# Patient Record
Sex: MALE | Race: WHITE
[De-identification: names, ages, dates, MRNs, and addresses within clinical notes are randomized per-mention and may not be internally consistent; named-entity substitution may affect disease eponyms.]

---

## 2018-12-18 ENCOUNTER — HOSPITAL ENCOUNTER (OUTPATIENT)
Age: 58
End: 2018-12-18
Payer: COMMERCIAL

## 2018-12-18 DIAGNOSIS — Z12.5: Primary | ICD-10-CM

## 2018-12-18 LAB — PSA,TOTAL - ANNUAL SCREEN: 1.05 NG/ML (ref 0–4)

## 2018-12-18 PROCEDURE — 36415 COLL VENOUS BLD VENIPUNCTURE: CPT

## 2018-12-18 PROCEDURE — G0103 PSA SCREENING: HCPCS

## 2018-12-18 PROCEDURE — 84153 ASSAY OF PSA TOTAL: CPT

## 2019-07-24 ENCOUNTER — HOSPITAL ENCOUNTER (OUTPATIENT)
Age: 59
End: 2019-07-24
Payer: COMMERCIAL

## 2019-07-24 DIAGNOSIS — C44.612: Primary | ICD-10-CM

## 2019-07-24 PROCEDURE — 88305 TISSUE EXAM BY PATHOLOGIST: CPT

## 2019-11-14 ENCOUNTER — HOSPITAL ENCOUNTER (OUTPATIENT)
Age: 59
End: 2019-11-14
Payer: COMMERCIAL

## 2019-11-14 DIAGNOSIS — M47.812: ICD-10-CM

## 2019-11-14 DIAGNOSIS — M50.31: ICD-10-CM

## 2019-11-14 DIAGNOSIS — M19.012: ICD-10-CM

## 2019-11-14 DIAGNOSIS — M50.30: Primary | ICD-10-CM

## 2019-11-14 DIAGNOSIS — M19.011: ICD-10-CM

## 2019-11-14 DIAGNOSIS — M48.02: ICD-10-CM

## 2019-11-14 PROCEDURE — 73030 X-RAY EXAM OF SHOULDER: CPT

## 2019-11-14 PROCEDURE — 72050 X-RAY EXAM NECK SPINE 4/5VWS: CPT

## 2019-11-27 ENCOUNTER — HOSPITAL ENCOUNTER (OUTPATIENT)
Age: 59
End: 2019-11-27
Payer: COMMERCIAL

## 2019-11-27 DIAGNOSIS — M50.30: Primary | ICD-10-CM

## 2019-11-27 DIAGNOSIS — M48.02: ICD-10-CM

## 2019-11-27 PROCEDURE — 72141 MRI NECK SPINE W/O DYE: CPT

## 2019-12-19 ENCOUNTER — HOSPITAL ENCOUNTER (OUTPATIENT)
Age: 59
End: 2019-12-19
Payer: COMMERCIAL

## 2019-12-19 DIAGNOSIS — Z12.5: Primary | ICD-10-CM

## 2019-12-19 LAB — PSA,TOTAL - ANNUAL SCREEN: 1.07 NG/ML (ref 0–4)

## 2019-12-19 PROCEDURE — 36415 COLL VENOUS BLD VENIPUNCTURE: CPT

## 2019-12-19 PROCEDURE — G0103 PSA SCREENING: HCPCS

## 2019-12-19 PROCEDURE — 84153 ASSAY OF PSA TOTAL: CPT

## 2019-12-23 ENCOUNTER — HOSPITAL ENCOUNTER (OUTPATIENT)
Dept: HOSPITAL 100 - PT | Age: 59
Discharge: HOME | End: 2019-12-23
Payer: COMMERCIAL

## 2019-12-23 DIAGNOSIS — M48.02: Primary | ICD-10-CM

## 2019-12-23 PROCEDURE — 97162 PT EVAL MOD COMPLEX 30 MIN: CPT

## 2019-12-23 PROCEDURE — 97012 MECHANICAL TRACTION THERAPY: CPT

## 2019-12-23 PROCEDURE — 97110 THERAPEUTIC EXERCISES: CPT

## 2021-01-12 ENCOUNTER — HOSPITAL ENCOUNTER (OUTPATIENT)
Age: 61
End: 2021-01-12
Payer: COMMERCIAL

## 2021-01-12 DIAGNOSIS — Z12.5: Primary | ICD-10-CM

## 2021-01-12 LAB — PSA,TOTAL - ANNUAL SCREEN: 1.18 NG/ML (ref 0–4)

## 2021-01-12 PROCEDURE — 84153 ASSAY OF PSA TOTAL: CPT

## 2021-01-12 PROCEDURE — 36415 COLL VENOUS BLD VENIPUNCTURE: CPT

## 2021-01-12 PROCEDURE — G0103 PSA SCREENING: HCPCS

## 2021-04-16 ENCOUNTER — HOSPITAL ENCOUNTER (OUTPATIENT)
Dept: HOSPITAL 100 - EN | Age: 61
Discharge: HOME | End: 2021-04-16
Payer: COMMERCIAL

## 2021-04-16 VITALS
OXYGEN SATURATION: 96 % | HEART RATE: 72 BPM | DIASTOLIC BLOOD PRESSURE: 61 MMHG | RESPIRATION RATE: 16 BRPM | SYSTOLIC BLOOD PRESSURE: 128 MMHG | TEMPERATURE: 97.8 F

## 2021-04-16 VITALS
HEART RATE: 53 BPM | RESPIRATION RATE: 16 BRPM | SYSTOLIC BLOOD PRESSURE: 94 MMHG | OXYGEN SATURATION: 97 % | DIASTOLIC BLOOD PRESSURE: 72 MMHG

## 2021-04-16 VITALS
OXYGEN SATURATION: 98 % | DIASTOLIC BLOOD PRESSURE: 61 MMHG | SYSTOLIC BLOOD PRESSURE: 96 MMHG | HEART RATE: 68 BPM | RESPIRATION RATE: 16 BRPM

## 2021-04-16 VITALS
HEART RATE: 55 BPM | SYSTOLIC BLOOD PRESSURE: 88 MMHG | TEMPERATURE: 98.06 F | OXYGEN SATURATION: 99 % | RESPIRATION RATE: 16 BRPM | DIASTOLIC BLOOD PRESSURE: 64 MMHG

## 2021-04-16 VITALS
DIASTOLIC BLOOD PRESSURE: 61 MMHG | HEART RATE: 56 BPM | SYSTOLIC BLOOD PRESSURE: 128 MMHG | RESPIRATION RATE: 16 BRPM | OXYGEN SATURATION: 97 %

## 2021-04-16 VITALS
RESPIRATION RATE: 16 BRPM | DIASTOLIC BLOOD PRESSURE: 61 MMHG | OXYGEN SATURATION: 99 % | SYSTOLIC BLOOD PRESSURE: 128 MMHG | TEMPERATURE: 97.88 F | HEART RATE: 55 BPM

## 2021-04-16 VITALS
RESPIRATION RATE: 16 BRPM | DIASTOLIC BLOOD PRESSURE: 61 MMHG | OXYGEN SATURATION: 97 % | SYSTOLIC BLOOD PRESSURE: 85 MMHG | HEART RATE: 59 BPM

## 2021-04-16 VITALS — DIASTOLIC BLOOD PRESSURE: 61 MMHG | SYSTOLIC BLOOD PRESSURE: 128 MMHG

## 2021-04-16 VITALS — BODY MASS INDEX: 23.8 KG/M2

## 2021-04-16 DIAGNOSIS — N40.0: ICD-10-CM

## 2021-04-16 DIAGNOSIS — K21.9: ICD-10-CM

## 2021-04-16 DIAGNOSIS — Z12.11: Primary | ICD-10-CM

## 2021-04-16 DIAGNOSIS — Z79.899: ICD-10-CM

## 2021-04-16 PROCEDURE — 0DJD8ZZ INSPECTION OF LOWER INTESTINAL TRACT, VIA NATURAL OR ARTIFICIAL OPENING ENDOSCOPIC: ICD-10-PCS | Performed by: SURGERY

## 2021-04-16 PROCEDURE — 87426 SARSCOV CORONAVIRUS AG IA: CPT

## 2021-04-16 PROCEDURE — 45378 DIAGNOSTIC COLONOSCOPY: CPT

## 2021-04-16 PROCEDURE — C9803 HOPD COVID-19 SPEC COLLECT: HCPCS

## 2021-04-19 ENCOUNTER — HOSPITAL ENCOUNTER (OUTPATIENT)
Age: 61
End: 2021-04-19
Payer: COMMERCIAL

## 2021-04-19 VITALS — BODY MASS INDEX: 23.8 KG/M2

## 2021-04-19 DIAGNOSIS — Z13.220: Primary | ICD-10-CM

## 2021-04-19 LAB
CHOLEST SERPL-MCNC: 241 MG/DL
TRIGLYCERIDES: 39 MG/DL
VLDLC SERPL-MCNC: 8 MG/DL (ref 5–40)

## 2021-04-19 PROCEDURE — 80061 LIPID PANEL: CPT

## 2021-04-19 PROCEDURE — 36415 COLL VENOUS BLD VENIPUNCTURE: CPT

## 2021-10-26 ENCOUNTER — HOSPITAL ENCOUNTER (OUTPATIENT)
Age: 61
End: 2021-10-26
Payer: COMMERCIAL

## 2021-10-26 DIAGNOSIS — R10.13: ICD-10-CM

## 2021-10-26 DIAGNOSIS — C32.0: Primary | ICD-10-CM

## 2021-10-26 LAB
CREATININE FINGERSTICK: 0.6 MG/DL (ref 0.7–1.3)
EGFR FINGERSTICK: > 60 ML/MIN (ref 60–?)

## 2021-10-26 PROCEDURE — 70491 CT SOFT TISSUE NECK W/DYE: CPT

## 2022-10-25 ENCOUNTER — HOSPITAL ENCOUNTER (OUTPATIENT)
Dept: HOSPITAL 100 - MFPLAB | Age: 62
Discharge: HOME | End: 2022-10-25
Payer: COMMERCIAL

## 2022-10-25 DIAGNOSIS — Z13.220: ICD-10-CM

## 2022-10-25 DIAGNOSIS — Z00.00: Primary | ICD-10-CM

## 2022-10-25 LAB
ALANINE AMINOTRANSFER ALT/SGPT: 22 U/L (ref 16–61)
ALBUMIN SERPL-MCNC: 3.7 G/DL (ref 3.2–5)
ALKALINE PHOSPHATASE: 68 U/L (ref 45–117)
ANION GAP: 7 (ref 5–15)
AST(SGOT): 20 U/L (ref 15–37)
BUN SERPL-MCNC: 20 MG/DL (ref 7–18)
BUN/CREAT RATIO: 20.7 RATIO (ref 10–20)
CALCIUM SERPL-MCNC: 9.3 MG/DL (ref 8.5–10.1)
CARBON DIOXIDE: 25 MMOL/L (ref 21–32)
CHLORIDE: 107 MMOL/L (ref 98–107)
CHOLEST SERPL-MCNC: 222 MG/DL
EST GLOM FILT RATE - AFR AMER: 102 ML/MIN (ref 60–?)
GLOBULIN: 3.2 G/DL (ref 2.2–4.2)
GLUCOSE: 86 MG/DL (ref 74–106)
POTASSIUM: 4.5 MMOL/L (ref 3.5–5.1)
TRIGLYCERIDES: 35 MG/DL
VLDLC SERPL-MCNC: 7 MG/DL (ref 5–40)

## 2022-10-25 PROCEDURE — 80053 COMPREHEN METABOLIC PANEL: CPT

## 2022-10-25 PROCEDURE — 36415 COLL VENOUS BLD VENIPUNCTURE: CPT

## 2022-10-25 PROCEDURE — 80061 LIPID PANEL: CPT

## 2023-01-19 ENCOUNTER — HOSPITAL ENCOUNTER (OUTPATIENT)
Dept: HOSPITAL 100 - LAB | Age: 63
Discharge: HOME | End: 2023-01-19
Payer: COMMERCIAL

## 2023-01-19 DIAGNOSIS — Z12.5: Primary | ICD-10-CM

## 2023-01-19 LAB — PSA,TOTAL - ANNUAL SCREEN: 1.27 NG/ML (ref 0–4)

## 2023-01-19 PROCEDURE — 84153 ASSAY OF PSA TOTAL: CPT

## 2023-01-19 PROCEDURE — G0103 PSA SCREENING: HCPCS

## 2023-01-19 PROCEDURE — 36415 COLL VENOUS BLD VENIPUNCTURE: CPT

## 2023-12-07 ENCOUNTER — HOSPITAL ENCOUNTER (OUTPATIENT)
Dept: HOSPITAL 100 - MTLAB | Age: 63
Discharge: HOME | End: 2023-12-07
Payer: COMMERCIAL

## 2023-12-07 DIAGNOSIS — Z13.1: Primary | ICD-10-CM

## 2023-12-07 DIAGNOSIS — Z13.220: ICD-10-CM

## 2023-12-07 LAB
ANION GAP: 1 (ref 5–15)
BUN SERPL-MCNC: 18 MG/DL (ref 7–18)
BUN/CREAT RATIO: 19.8 RATIO (ref 10–20)
CALCIUM SERPL-MCNC: 8.9 MG/DL (ref 8.5–10.1)
CARBON DIOXIDE: 30 MMOL/L (ref 21–32)
CHLORIDE: 109 MMOL/L (ref 98–107)
CHOLEST SERPL-MCNC: 222 MG/DL
EST GLOM FILT RATE - AFR AMER: 109 ML/MIN (ref 60–?)
GLUCOSE: 98 MG/DL (ref 74–106)
POTASSIUM: 3.9 MMOL/L (ref 3.5–5.1)
TRIGLYCERIDES: 52 MG/DL
VLDLC SERPL-MCNC: 10 MG/DL (ref 5–40)

## 2023-12-07 PROCEDURE — 36415 COLL VENOUS BLD VENIPUNCTURE: CPT

## 2023-12-07 PROCEDURE — 80061 LIPID PANEL: CPT

## 2023-12-07 PROCEDURE — 80048 BASIC METABOLIC PNL TOTAL CA: CPT

## 2024-01-29 ENCOUNTER — HOSPITAL ENCOUNTER (OUTPATIENT)
Dept: HOSPITAL 100 - LAB | Age: 64
Discharge: HOME | End: 2024-01-29
Payer: COMMERCIAL

## 2024-01-29 DIAGNOSIS — Z12.5: Primary | ICD-10-CM

## 2024-01-29 LAB — PSA,TOTAL - ANNUAL SCREEN: 1.77 NG/ML (ref 0–4)

## 2024-01-29 PROCEDURE — 84153 ASSAY OF PSA TOTAL: CPT

## 2024-01-29 PROCEDURE — G0103 PSA SCREENING: HCPCS

## 2024-01-29 PROCEDURE — 36415 COLL VENOUS BLD VENIPUNCTURE: CPT

## 2024-02-21 ENCOUNTER — HOSPITAL ENCOUNTER (OUTPATIENT)
Dept: RADIOLOGY | Facility: CLINIC | Age: 64
Discharge: HOME | End: 2024-02-21
Payer: COMMERCIAL

## 2024-02-21 DIAGNOSIS — Z00.00 ENCOUNTER FOR GENERAL ADULT MEDICAL EXAMINATION WITHOUT ABNORMAL FINDINGS: ICD-10-CM

## 2024-02-21 PROCEDURE — 75571 CT HRT W/O DYE W/CA TEST: CPT

## 2024-08-28 ENCOUNTER — HOSPITAL ENCOUNTER (OUTPATIENT)
Dept: HOSPITAL 100 - MFPLAB | Age: 64
Discharge: HOME | End: 2024-08-28
Payer: COMMERCIAL

## 2024-08-28 DIAGNOSIS — L29.9: Primary | ICD-10-CM

## 2024-08-28 LAB
ALANINE AMINOTRANSFER ALT/SGPT: 20 U/L (ref 16–61)
ALBUMIN SERPL-MCNC: 3.7 G/DL (ref 3.2–5)
ALKALINE PHOSPHATASE: 69 U/L (ref 45–117)
ANION GAP: 7 (ref 5–15)
AST(SGOT): 16 U/L (ref 15–37)
BUN SERPL-MCNC: 32 MG/DL (ref 7–18)
BUN/CREAT RATIO: 35.4 RATIO (ref 10–20)
CALCIUM SERPL-MCNC: 9 MG/DL (ref 8.5–10.1)
CARBON DIOXIDE: 26 MMOL/L (ref 21–32)
CHLORIDE: 106 MMOL/L (ref 98–107)
DEPRECATED RDW RBC: 40.3 FL (ref 35.1–43.9)
ERYTHROCYTE [DISTWIDTH] IN BLOOD: 11.9 % (ref 11.6–14.6)
EST GLOM FILT RATE - AFR AMER: 109 ML/MIN (ref 60–?)
GLOBULIN: 3.1 G/DL (ref 2.2–4.2)
GLUCOSE: 113 MG/DL (ref 74–106)
HCT VFR BLD AUTO: 41.1 % (ref 40–54)
HEMOGLOBIN: 13.5 G/DL (ref 13–16.5)
HGB BLD-MCNC: 13.5 G/DL (ref 13–16.5)
IMMATURE GRANULOCYTES COUNT: 0.02 X10^3/UL (ref 0–0)
MCV RBC: 91.7 FL (ref 80–94)
MEAN CORP HGB CONC: 32.8 G/DL (ref 32–36)
MEAN PLATELET VOL.: 9.8 FL (ref 6.2–12)
NRBC FLAGGED BY ANALYZER: 0 % (ref 0–5)
PLATELET # BLD: 287 K/MM3 (ref 150–450)
PLATELET COUNT: 287 K/MM3 (ref 150–450)
POTASSIUM: 4.2 MMOL/L (ref 3.5–5.1)
RBC # BLD AUTO: 4.48 M/MM3 (ref 4.6–6.2)
RBC DISTRIBUTION WIDTH CV: 11.9 % (ref 11.6–14.6)
RBC DISTRIBUTION WIDTH SD: 40.3 FL (ref 35.1–43.9)
WBC # BLD AUTO: 6.8 K/MM3 (ref 4.4–11)
WHITE BLOOD COUNT: 6.8 K/MM3 (ref 4.4–11)

## 2024-08-28 PROCEDURE — 85025 COMPLETE CBC W/AUTO DIFF WBC: CPT

## 2024-08-28 PROCEDURE — 80053 COMPREHEN METABOLIC PANEL: CPT

## 2024-08-28 PROCEDURE — 85652 RBC SED RATE AUTOMATED: CPT

## 2024-08-28 PROCEDURE — 36415 COLL VENOUS BLD VENIPUNCTURE: CPT

## 2024-08-28 PROCEDURE — 84443 ASSAY THYROID STIM HORMONE: CPT

## 2024-10-23 ENCOUNTER — HOSPITAL ENCOUNTER (OUTPATIENT)
Dept: HOSPITAL 100 - MTRAD | Age: 64
Discharge: HOME | End: 2024-10-23
Payer: COMMERCIAL

## 2024-10-23 DIAGNOSIS — M50.30: Primary | ICD-10-CM

## 2024-10-23 PROCEDURE — 72050 X-RAY EXAM NECK SPINE 4/5VWS: CPT

## 2024-10-23 NOTE — XMS RPT_ITS
Comprehensive CCD (C-CDA v2.1)  
  
                          Created on: 2024  
  
  
KAYCE LARSON  
External Reference #: CDR,PersonID:[id number]  
: 1960  
Sex: Male  
  
Demographics  
  
  
                                        Address             [address]  [address]  
   
                                        Home Phone          [phone]  
   
                                        Work Phone          [phone]  
   
                                        Home Phone          [phone]  
   
                                        Work Phone          [phone]  
   
                                        Preferred Language  en  
   
                                        Marital Status        
   
                                        Congregation Affiliation Unknown  
   
                                        Race                White  
   
                                        Ethnic Group        Not  or Lati  
no  
  
  
Author  
  
  
                                        Organization        [facility]  
ion Partnership [facility]  
  
  
Care Team Providers  
  
  
                                Care Team Member Name Role            Phone  
   
                                Unavailable     Primary Care Provider Unavailabl e RANNEY, CHRISTOPHER BEHR Referring       Unavail  
able  
  
  
  
Allergies  
  
  
                                                    Allergy   
Classification                          Reported   
Allergen(s)               Allergy Type              Date of   
Onset                     Reaction(s)               Facility  
   
                                                      
(1 source)                              ALLERGIES NOT ON   
FILE;   
Translations:   
[ALLERGIES NOT   
ON FILE]                                Propensity to   
adverse   
reactions   
(disorder)                                                  [facility]   
Repository  
  
  
  
Results  
  
  
                          Test Name    Value        Interpretation Reference   
Range                                   Facility  
   
                                                    CT CARDIAC SCORING WO IV CON  
TRASTon 2024   
   
                                                    CT CARDIAC SCORING   
WO IV CONTRAST                          Interpreted By:   
Roel Vallejo,  
STUDY:  
CT CARDIAC SCORING WO   
IV CONTRAST;   
2024 8:36 am  
  
INDICATION:  
Signs/Symptoms:SCREEN  
ING.  
  
COMPARISON:  
None.  
  
ACCESSION NUMBER(S):  
[id number]  
  
ORDERING CLINICIAN:  
MATTEO JAUREGUI  
  
TECHNIQUE:  
Using prospective ECG   
gating, CT scan of   
the coronary arteries   
was  
performed without   
intravenous contrast.   
Coronary calcium   
scoring was  
performed according   
to the method of   
Agatston.  
  
FINDINGS:  
The score and   
distribution of   
calcium in the   
coronary arteries is   
as  
follows:  
  
LM 0  
LAD 8.9  
LCx 0  
RCA 54  
  
Total 64  
  
The visualized   
mid/lower ascending   
thoracic aorta   
measures 3.6 cm in  
diameter. The heart   
is normal in size. No   
pericardial effusion   
is  
present.  
  
No gross evidence of   
mediastinal or hilar   
lymphadenopathy or   
masses  
is identified. The   
visualized segments   
of the lungs are   
normally  
expanded.  
  
The visualized   
subdiaphragmatic   
structures appear   
intact.  
  
IMPRESSION:  
1. Coronary artery   
calcium score of 64*.  
  
*Coronary artery   
calcium scoring may   
be helpful in   
predicting the  
risk for future   
coronary heart   
disease events.   
According to the  
American College of   
Cardiology Foundation   
Clinical Expert   
Consensus  
Task Force, such   
testing provides   
important prognostic   
information in  
patients with more   
than one coronary   
heart disease risk   
factor. The  
coronary artery   
calcium score   
correlates with the   
annual risk of a  
non-fatal myocardial   
infarction or   
coronary heart   
disease death.  
  
Coronary artery score   
Annual Risk  
  
0-99 0.4%  
100-399 1.3%  
>400 2.4%  
  
These three   
 breakpoints    
correspond to lower,   
intermediate and high  
risk states for   
future coronary   
events. Such   
information should be  
used, along with   
appropriate clinical   
judgment, to make   
decisions  
regarding the   
intensity of risk   
factor management   
strategies to treat  
blood lipids and to   
modify other   
non-lipid coronary   
risk factors.  
  
Reference: [place]   
P et al. Circulation.   
2007; 115:402-426  
  
MACRO:  
None  
  
Signed by: Roel Vallejo 2024 4:55   
PM  
Dictation   
workstation:   
[id number]        [facility]  
   
                                                    CT for calcium scoring WO co  
ntrast and CTA W contrast IV Heart and coronary   
arterieson 2024   
   
                                                            1. Coronary artery   
calcium score of 64*.  
  
*Coronary artery   
calcium scoring may   
be helpful in   
predicting the  
risk for future   
coronary heart   
disease events.   
According to the  
American College of   
Cardiology Foundation   
Clinical Expert   
Consensus  
Task Force, such   
testing provides   
important prognostic   
information in  
patients with more   
than one coronary   
heart disease risk   
factor. The  
coronary artery   
calcium score   
correlates with the   
annual risk of a  
non-fatal myocardial   
infarction or   
coronary heart   
disease death.  
  
Coronary artery score   
Annual Risk  
  
0-99 0.4%  
100-399 1.3%  
>400  2.4%  
  
These three   
 breakpoints    
correspond to lower,   
intermediate and high  
risk states for   
future coronary   
events. Such   
information should be  
used, along with   
appropriate clinical   
judgment, to make   
decisions  
regarding the   
intensity of risk   
factor management   
strategies to treat  
blood lipids and to   
modify other   
non-lipid coronary   
risk factors.  
  
Reference: [place]   
P et al. Circulation.   
2007; 115:402-426  
  
MACRO:  
None  
  
Signed by: Roel Vallejo 2024 4:55   
PM  
Dictation   
workstation:   
[id number]                                                 [facility]  
   
                                                            Interpreted By:   
Roel Vallejo,  
STUDY:  
CT CARDIAC SCORING WO   
IV CONTRAST;   
2024 8:36 am  
  
INDICATION:  
Signs/Symptoms:SCREEN  
ING.  
  
COMPARISON:  
None.  
  
ACCESSION NUMBER(S):  
[id number]  
  
ORDERING CLINICIAN:  
MATTEO JAUREGUI  
  
TECHNIQUE:  
Using prospective ECG   
gating, CT scan of   
the coronary arteries   
was  
performed without   
intravenous contrast.   
Coronary calcium   
scoring was  
performed according   
to the method of   
Agatston.  
  
FINDINGS:  
The score and   
distribution of   
calcium in the   
coronary arteries is   
as  
follows:  
  
LM 0  
LAD 8.9  
LCx 0  
RCA 54  
  
Total 64  
  
The visualized   
mid/lower ascending   
thoracic aorta   
measures 3.6 cm in  
diameter. The heart   
is normal in size. No   
pericardial effusion   
is  
present.  
  
No gross evidence of   
mediastinal or hilar   
lymphadenopathy or   
masses  
is identified. The   
visualized segments   
of the lungs are   
normally  
expanded.  
  
The visualized   
subdiaphragmatic   
structures appear   
intact.  
                                                            [facility]  
   
                                                            Roel Vallejo MD -  
   
2024 Formatting   
of this note might be   
different from the   
original.  
Interpreted By:   
Roel Vallejo,  
STUDY:  
CT CARDIAC SCORING WO   
IV CONTRAST;   
2024 8:36 am  
  
INDICATION:  
Signs/Symptoms:SCREEN  
ING.  
  
COMPARISON:  
None.  
  
ACCESSION NUMBER(S):  
[id number]  
  
ORDERING CLINICIAN:  
MATTEO JAUREGUI  
  
TECHNIQUE:  
Using prospective ECG   
gating, CT scan of   
the coronary arteries   
was  
performed without   
intravenous contrast.   
Coronary calcium   
scoring was  
performed according   
to the method of   
Agatston.  
  
FINDINGS:  
The score and   
distribution of   
calcium in the   
coronary arteries is   
as  
follows:  
  
LM 0  
LAD 8.9  
LCx 0  
RCA 54  
  
Total 64  
  
The visualized   
mid/lower ascending   
thoracic aorta   
measures 3.6 cm in  
diameter. The heart   
is normal in size. No   
pericardial effusion   
is  
present.  
  
No gross evidence of   
mediastinal or hilar   
lymphadenopathy or   
masses  
is identified. The   
visualized segments   
of the lungs are   
normally  
expanded.  
  
The visualized   
subdiaphragmatic   
structures appear   
intact.  
  
IMPRESSION:  
1. Coronary artery   
calcium score of 64*.  
  
*Coronary artery   
calcium scoring may   
be helpful in   
predicting the  
risk for future   
coronary heart   
disease events.   
According to the  
American College of   
Cardiology Foundation   
Clinical Expert   
Consensus  
Task Force, such   
testing provides   
important prognostic   
information in  
patients with more   
than one coronary   
heart disease risk   
factor. The  
coronary artery   
calcium score   
correlates with the   
annual risk of a  
non-fatal myocardial   
infarction or   
coronary heart   
disease death.  
  
Coronary artery score   
Annual Risk  
  
0-99 0.4%  
100-399 1.3%  
>400 2.4%  
  
These three   
 breakpoints    
correspond to lower,   
intermediate and high  
risk states for   
future coronary   
events. Such   
information should be  
used, along with   
appropriate clinical   
judgment, to make   
decisions  
regarding the   
intensity of risk   
factor management   
strategies to treat  
blood lipids and to   
modify other   
non-lipid coronary   
risk factors.  
  
Reference: [place]   
P et al. Circulation.   
2007; 115:402-426  
  
MACRO:  
None  
  
Signed by: Roel Vallejo 2024 4:55   
PM  
Dictation   
workstation:   
[id number]  
                                                            [facility]  
Work Phone:   
[phone]  
   
                                                    Radiology Study   
observation   
(narrative)                                                     [facility]  
Work Phone:   
[phone]  
   
                                                    CT for calcium scoring WO co  
ntrast and CTA W contrast IV Heart and coronary   
arteriesOrdered By: Roel Vallejo on 2024   
   
                                                                  [facility]  
Work Phone:   
[phone]  
  
  
  
Encounters  
  
  
                          Encounter Date Encounter Type Care Provider Facility  
   
                                                    Start: 2024  
End: 2024     ambulatory          CHRISTOPHER BEHR RANNEY [facility]  
   
                                                    Start: 2024  
End: 2024                         Encounter for general   
adult medical   
examination without   
abnormal findings         [facility]  
   
                                                    Start: 2024  
End: 2024                         Patient encounter   
status                    [address]  
Work Phone:   
[phone]  
   
                                                    Start: 2024  
End: 2024                         Subsequent hospital   
visit by physician        [address]  
   
                                        Comment on above:   Encounter for genera  
l adult medical examination without   
abnormal   
findings   
  
  
  
Procedures  
  
  
                          Date         Procedure    Procedure Detail Performing   
Clinician  
   
                                        Start: 2024   CT CARDIAC SCORING W  
O IV   
CONTRAST                                            MATTEO JAUREGUI  
   
                                        Start: 2024   Ct heart no contrast  
   
quant eval coronry   
calcium                                             Christopher Behr Ranney MD  
Work Phone: [phone]  
  
  
  
Plan of Treatment  
  
  
                          Date         Care Activity Detail       Author  
   
                                        Start: 2031   DTaP/Tdap/Td Vaccine  
s   
(3 - Td or Tdap)                        DTaP/Tdap/Td Vaccines   
(3 - Td or Tdap)                        [facility]  
   
                                        Start: 2023   COVID-19 Vaccine ( season)                         COVID-19 Vaccine ( season)                         [facility]  
   
                          Start: 1979 Hepatitis C screening Hepatitis C [facility]  
   
                                        Start: 1961   MMR Vaccines (1 of 1  
 -   
Standard series)                        MMR Vaccines (1 of 1 -   
Standard series)                        [facility]  
   
                          Start: 1960 HIV screening HIV Screening [facility]  
   
                          Start: 1960 Lipid panel  Lipid Panel  [facility]  
   
                                        Start: 1960   Screening for   
malignant neoplasm of   
colon                                               [facility]  
   
                          Start: 1960 Yearly Adult Physical Yearly Adult P  
hysical [facility]  
  
  
  
Payers  
  
  
                          Date         Payer Category Payer        Policy ID  
   
                                2023      Unknown         [employer]  
[employer] [facility] M[employer] [facility] MED   
[id number]   
2023-Present [address] [place]   
[address]                              [id number]  
[id number]  
   
                          2023   Unknown                   [id number]  
   
                          1960   Unknown                   [id number]   
[id number]  
  
  
  
Social History  
  
  
                          Date         Type         Detail       Facility  
   
                                                            Tobacco smoking   
status NHIS                             Tobacco smoking   
consumption unknown                     [facility]  
Work Phone:   
[phone]  
   
                          Start: 1960 Sex Assigned At Birth Not on file  [facility]  
Work Phone:   
[phone]  
   
                                       Gender identity Not on file  [facility]  
[facility]  
Work Phone:   
[phone]  
   
                                                    Start: 2024  
End: 2024                         Exposure to   
SARS-CoV-2 (event)        Not sure                  [facility]  
  
  
  
Evaluation note  
  
  
                                Note Date & Type Note            Facility  
  
  
  
                                                    Evaluation note   
  
  
  
                                                    Diagnosis  
   
                                                      
  
  
Encounter for general adult medical examination without abnormal findings  
  
documented in this encounter  
[facility]  
Work Phone: [phone]  
  
Reason for Referral  
  
  
                          Specialty    Diagnoses / Procedures Referred By Contac  
t Referred To Contact  
   
                                        Radiology             
  
  
Diagnoses  
  
  
Encounter for general adult   
medical examination without   
abnormal findings  
  
  
  
Procedures  
  
  
CT cardiac scoring wo IV   
contrast                                  
  
  
Ranney, Christopher Behr,  [address]  
  
  
[address]  
  
  
[place] [address]  
  
  
Phone: [phone]                       
  
  
  
  
  
                          Referral ID  Status       Reason       Start   
Date                                    Expiration   
Date                                    Visits   
Requested                               Visits   
Authorized  
   
                                        [id number]             Pending   
Review                                    
  
  
Perform   
Procedure                                 
3                   2024          1                   1  
  
  
  
Summary Purpose  
  
  
                                                      
  
  
  
Family History  
No Family History Records Found  
  
Advance Directives  
No Advanced Directives Records Found  
  
Additional Source Comments  
  
  
  
                                                    Reason for Visit (unrecogniz  
ed section and content)  
   
                                          
  
  
  
                          Specialty    Diagnoses / Procedures Referred By Contac  
t Referred To Contact  
   
                                        Radiology             
  
  
Diagnoses  
  
  
Encounter for general adult   
medical examination without   
abnormal findings  
  
  
  
Procedures  
  
  
CT cardiac scoring wo IV   
contrast                                  
  
  
Ranney, Christopher Behr, MD  
  
  
[address]  
  
  
[address]  
  
  
[place] [address]  
  
  
Phone: [phone]                       
  
  
  
  
  
                          Referral ID  Status       Reason       Start   
Date                                    Expiration   
Date                                    Visits   
Requested                               Visits   
Authorized  
   
                                        [id number]             Pending   
Review                                    
  
  
Perform   
Procedure                                 
3                   2024          1                   1  
  
  
  
  
  
                                                    PREGNANCY (unrecognized sect  
ion and content)  
   
                                                    No Pregnancy Status Records   
Found  
  
  
  
  
                                                    INFORMATION SOURCE (unrecogn  
ized section and content)  
   
                                          
  
  
  
                                        DATE CREATED        AUTHOR  
   
                                2024                      [facility]  
  
  
FOR RECORDS PERTAINING TO PATIENTS WHO ARE OR HAVE BEEN ENROLLED IN A CHEMICAL 
DEPENDENCY/SUBSTANCEABUSE PROGRAM, SOME INFORMATION MAY BE OMITTED. This 
clinical summary was aggregated from multiple sources. Caution should be 
exercised in using it in the provision of clinical care. This summary normalizes
information from multiple sources, and as a consequence, information in this 
document may materially change the coding, format and clinical context of 
patient data. In addition, data may be omitted in some cases. CLINICAL DECISIONS
SHOULD BE BASED ON THE PRIMARY CLINICAL RECORDS. [employer] provides 
no warranty or guarantee of the accuracy or completeness of information in this 
document.

## 2024-11-18 ENCOUNTER — HOSPITAL ENCOUNTER (OUTPATIENT)
Age: 64
Discharge: HOME | End: 2024-11-18
Payer: COMMERCIAL

## 2024-11-18 DIAGNOSIS — M50.30: Primary | ICD-10-CM

## 2024-11-18 PROCEDURE — 72141 MRI NECK SPINE W/O DYE: CPT

## 2025-01-30 ENCOUNTER — HOSPITAL ENCOUNTER (OUTPATIENT)
Dept: HOSPITAL 100 - LAB | Age: 65
Discharge: HOME | End: 2025-01-30
Payer: COMMERCIAL

## 2025-01-30 DIAGNOSIS — Z12.5: Primary | ICD-10-CM

## 2025-01-30 LAB — PSA,TOTAL - ANNUAL SCREEN: 2.22 NG/ML (ref 0–4)

## 2025-01-30 PROCEDURE — 36415 COLL VENOUS BLD VENIPUNCTURE: CPT

## 2025-01-30 PROCEDURE — G0103 PSA SCREENING: HCPCS

## 2025-01-30 PROCEDURE — 84153 ASSAY OF PSA TOTAL: CPT

## 2025-01-30 NOTE — XMS RPT_ITS
Comprehensive CCD (C-CDA v2.1)  
  
                          Created on: 2025  
  
  
KAYCE LARSON  
External Reference #: CDR,PersonID:[id number]  
: 1960  
Sex: Male  
  
Demographics  
  
  
                                        Address             [address]  [address]  
   
                                        Home Phone          [phone]  
   
                                        Work Phone          [phone]  
   
                                        Home Phone          [phone]  
   
                                        Work Phone          [phone]  
   
                                        Preferred Language  en  
   
                                        Marital Status        
   
                                        Religion Affiliation Unknown  
   
                                        Race                White  
   
                                        Ethnic Group        Not  or Lati  
no  
  
  
Author  
  
  
                                        Organization        [facility]  
ion Partnership [facility]  
  
  
Care Team Providers  
  
  
                                Care Team Member Name Role            Phone  
   
                                Unavailable     Primary Care Provider Unavailabl e RANNEY, CHRISTOPHER BEHR Referring       Unavail  
able  
  
  
  
Allergies  
  
  
                                                    Allergy   
Classification                          Reported   
Allergen(s)               Allergy Type              Date of   
Onset                     Reaction(s)               Facility  
   
                                                      
(1 source)                              ALLERGIES NOT ON   
FILE;   
Translations:   
[ALLERGIES NOT   
ON FILE]                                Propensity to   
adverse   
reactions   
(disorder)                                                  [facility]   
Repository  
  
  
  
Results  
  
  
                          Test Name    Value        Interpretation Reference   
Range                                   Facility  
   
                                                    CT CARDIAC SCORING WO IV CON  
TRASTon 2024   
   
                                                    CT CARDIAC SCORING   
WO IV CONTRAST                          Interpreted By:   
Roel Vallejo,  
STUDY:  
CT CARDIAC SCORING WO   
IV CONTRAST;   
2024 8:36 am  
  
INDICATION:  
Signs/Symptoms:SCREEN  
ING.  
  
COMPARISON:  
None.  
  
ACCESSION NUMBER(S):  
[id number]  
  
ORDERING CLINICIAN:  
MATTEO JAUREGUI  
  
TECHNIQUE:  
Using prospective ECG   
gating, CT scan of   
the coronary arteries   
was  
performed without   
intravenous contrast.   
Coronary calcium   
scoring was  
performed according   
to the method of   
Agatston.  
  
FINDINGS:  
The score and   
distribution of   
calcium in the   
coronary arteries is   
as  
follows:  
  
LM 0  
LAD 8.9  
LCx 0  
RCA 54  
  
Total 64  
  
The visualized   
mid/lower ascending   
thoracic aorta   
measures 3.6 cm in  
diameter. The heart   
is normal in size. No   
pericardial effusion   
is  
present.  
  
No gross evidence of   
mediastinal or hilar   
lymphadenopathy or   
masses  
is identified. The   
visualized segments   
of the lungs are   
normally  
expanded.  
  
The visualized   
subdiaphragmatic   
structures appear   
intact.  
  
IMPRESSION:  
1. Coronary artery   
calcium score of 64*.  
  
*Coronary artery   
calcium scoring may   
be helpful in   
predicting the  
risk for future   
coronary heart   
disease events.   
According to the  
American College of   
Cardiology Foundation   
Clinical Expert   
Consensus  
Task Force, such   
testing provides   
important prognostic   
information in  
patients with more   
than one coronary   
heart disease risk   
factor. The  
coronary artery   
calcium score   
correlates with the   
annual risk of a  
non-fatal myocardial   
infarction or   
coronary heart   
disease death.  
  
Coronary artery score   
Annual Risk  
  
0-99 0.4%  
100-399 1.3%  
>400 2.4%  
  
These three   
 breakpoints    
correspond to lower,   
intermediate and high  
risk states for   
future coronary   
events. Such   
information should be  
used, along with   
appropriate clinical   
judgment, to make   
decisions  
regarding the   
intensity of risk   
factor management   
strategies to treat  
blood lipids and to   
modify other   
non-lipid coronary   
risk factors.  
  
Reference: [place]field   
P et al. Circulation.   
2007; 115:402-426  
  
MACRO:  
None  
  
Signed by: Roel Vallejo 2024 4:55   
PM  
Dictation   
workstation:   
[id number]        [facility]  
   
                                                    CT for calcium scoring WO co  
ntrast and CTA W contrast IV Heart and coronary   
arterieson 2024   
   
                                                            1. Coronary artery   
calcium score of 64*.  
  
*Coronary artery   
calcium scoring may   
be helpful in   
predicting the  
risk for future   
coronary heart   
disease events.   
According to the  
American College of   
Cardiology Foundation   
Clinical Expert   
Consensus  
Task Force, such   
testing provides   
important prognostic   
information in  
patients with more   
than one coronary   
heart disease risk   
factor. The  
coronary artery   
calcium score   
correlates with the   
annual risk of a  
non-fatal myocardial   
infarction or   
coronary heart   
disease death.  
  
Coronary artery score   
Annual Risk  
  
0-99 0.4%  
100-399 1.3%  
>400  2.4%  
  
These three   
 breakpoints    
correspond to lower,   
intermediate and high  
risk states for   
future coronary   
events. Such   
information should be  
used, along with   
appropriate clinical   
judgment, to make   
decisions  
regarding the   
intensity of risk   
factor management   
strategies to treat  
blood lipids and to   
modify other   
non-lipid coronary   
risk factors.  
  
Reference: [place]field   
P et al. Circulation.   
2007; 115:402-426  
  
MACRO:  
None  
  
Signed by: Roel Vallejo 2024 4:55   
PM  
Dictation   
workstation:   
[id number]                                                 [facility]  
   
                                                            Interpreted By:   
Roel Vallejo,  
STUDY:  
CT CARDIAC SCORING WO   
IV CONTRAST;   
2024 8:36 am  
  
INDICATION:  
Signs/Symptoms:SCREEN  
ING.  
  
COMPARISON:  
None.  
  
ACCESSION NUMBER(S):  
[id number]  
  
ORDERING CLINICIAN:  
MATTEO JAUREGUI  
  
TECHNIQUE:  
Using prospective ECG   
gating, CT scan of   
the coronary arteries   
was  
performed without   
intravenous contrast.   
Coronary calcium   
scoring was  
performed according   
to the method of   
Agatston.  
  
FINDINGS:  
The score and   
distribution of   
calcium in the   
coronary arteries is   
as  
follows:  
  
LM 0  
LAD 8.9  
LCx 0  
RCA 54  
  
Total 64  
  
The visualized   
mid/lower ascending   
thoracic aorta   
measures 3.6 cm in  
diameter. The heart   
is normal in size. No   
pericardial effusion   
is  
present.  
  
No gross evidence of   
mediastinal or hilar   
lymphadenopathy or   
masses  
is identified. The   
visualized segments   
of the lungs are   
normally  
expanded.  
  
The visualized   
subdiaphragmatic   
structures appear   
intact.  
                                                            [facility]  
   
                                                            Roel Vallejo MD -  
   
2024 Formatting   
of this note might be   
different from the   
original.  
Interpreted By:   
Roel Vallejo,  
STUDY:  
CT CARDIAC SCORING WO   
IV CONTRAST;   
2024 8:36 am  
  
INDICATION:  
Signs/Symptoms:SCREEN  
ING.  
  
COMPARISON:  
None.  
  
ACCESSION NUMBER(S):  
[id number]  
  
ORDERING CLINICIAN:  
MATTEO JAUREGUI  
  
TECHNIQUE:  
Using prospective ECG   
gating, CT scan of   
the coronary arteries   
was  
performed without   
intravenous contrast.   
Coronary calcium   
scoring was  
performed according   
to the method of   
Agatston.  
  
FINDINGS:  
The score and   
distribution of   
calcium in the   
coronary arteries is   
as  
follows:  
  
LM 0  
LAD 8.9  
LCx 0  
RCA 54  
  
Total 64  
  
The visualized   
mid/lower ascending   
thoracic aorta   
measures 3.6 cm in  
diameter. The heart   
is normal in size. No   
pericardial effusion   
is  
present.  
  
No gross evidence of   
mediastinal or hilar   
lymphadenopathy or   
masses  
is identified. The   
visualized segments   
of the lungs are   
normally  
expanded.  
  
The visualized   
subdiaphragmatic   
structures appear   
intact.  
  
IMPRESSION:  
1. Coronary artery   
calcium score of 64*.  
  
*Coronary artery   
calcium scoring may   
be helpful in   
predicting the  
risk for future   
coronary heart   
disease events.   
According to the  
American College of   
Cardiology Foundation   
Clinical Expert   
Consensus  
Task Force, such   
testing provides   
important prognostic   
information in  
patients with more   
than one coronary   
heart disease risk   
factor. The  
coronary artery   
calcium score   
correlates with the   
annual risk of a  
non-fatal myocardial   
infarction or   
coronary heart   
disease death.  
  
Coronary artery score   
Annual Risk  
  
0-99 0.4%  
100-399 1.3%  
>400 2.4%  
  
These three   
 breakpoints    
correspond to lower,   
intermediate and high  
risk states for   
future coronary   
events. Such   
information should be  
used, along with   
appropriate clinical   
judgment, to make   
decisions  
regarding the   
intensity of risk   
factor management   
strategies to treat  
blood lipids and to   
modify other   
non-lipid coronary   
risk factors.  
  
Reference: Ritchie   
P et al. Circulation.   
2007; 115:402-426  
  
MACRO:  
None  
  
Signed by: Roel Vallejo 2024 4:55   
PM  
Dictation   
workstation:   
[id number]  
                                                            [facility]  
Work Phone:   
[phone]  
   
                                                    Radiology Study   
observation   
(narrative)                                                     [facility]  
Work Phone:   
[phone]  
   
                                                    CT for calcium scoring WO co  
ntrast and CTA W contrast IV Heart and coronary   
arteriesOrdered By: Roel Vallejo on 2024   
   
                                                                  [facility]  
Work Phone:   
[phone]  
  
  
  
Encounters  
  
  
                          Encounter Date Encounter Type Care Provider Facility  
   
                                                    Start: 2024  
End: 2024     ambulatory          CHRISTOPHER BEHR RANNEY [facility]  
   
                                                    Start: 2024  
End: 2024                         Encounter for general   
adult medical   
examination without   
abnormal findings         [facility]  
   
                                                    Start: 2024  
End: 2024                         Patient encounter   
status                    [address]  
Work Phone:   
[phone]  
   
                                                    Start: 2024  
End: 2024                         Subsequent hospital   
visit by physician        [address]  
   
                                        Comment on above:   Encounter for genera  
l adult medical examination without   
abnormal   
findings   
  
  
  
Procedures  
  
  
                          Date         Procedure    Procedure Detail Performing   
Clinician  
   
                                        Start: 2024   CT CARDIAC SCORING W  
O IV   
CONTRAST                                            MATTEO JAUREGUI  
   
                                        Start: 2024   Ct heart no contrast  
   
quant eval coronry   
calcium                                             Christopher Behr Ranney MD  
Work Phone: [phone]  
  
  
  
Plan of Treatment  
  
  
                          Date         Care Activity Detail       Author  
   
                                        Start: 2031   DTaP/Tdap/Td Vaccine  
s   
(3 - Td or Tdap)                        DTaP/Tdap/Td Vaccines   
(3 - Td or Tdap)                        [facility]  
   
                                        Start: 2023   COVID-19 Vaccine ( season)                         COVID-19 Vaccine ( season)                         [facility]  
   
                          Start: 1979 Hepatitis C screening Hepatitis C [facility]  
   
                                        Start: 1961   MMR Vaccines (1 of 1  
 -   
Standard series)                        MMR Vaccines (1 of 1 -   
Standard series)                        [facility]  
   
                          Start: 1960 HIV screening HIV Screening [facility]  
   
                          Start: 1960 Lipid panel  Lipid Panel  [facility]  
   
                                        Start: 1960   Screening for   
malignant neoplasm of   
colon                                               [facility]  
   
                          Start: 1960 Yearly Adult Physical Yearly Adult P  
hysical [facility]  
  
  
  
Payers  
  
  
                          Date         Payer Category Payer        Policy ID  
   
                                2023      Unknown         [employer]  
[facility] M[employer] [facility] MED   
[id number]   
2023-Present [address] [place]   
[address]                              [id number]  
[phone]  
   
                          2023   Unknown                   [id number]  
   
                          1960   Unknown                   [id number]   
[id number]  
  
  
  
Social History  
  
  
                          Date         Type         Detail       Facility  
   
                                                            Tobacco smoking   
status NHIS                             Tobacco smoking   
consumption unknown                     [facility]  
Work Phone:   
[phone]  
   
                          Start: 1960 Sex Assigned At Birth Not on file  [facility]  
Work Phone:   
[phone]  
   
                                       Gender identity Not on file  [facility]  
Work Phone:   
[phone]  
   
                                                    Start: 2024  
End: 2024                         Exposure to   
SARS-CoV-2 (event)        Not sure                  [facility]  
  
  
  
Evaluation note  
  
  
                                Note Date & Type Note            Facility  
  
  
  
                                                    Evaluation note   
  
  
  
                                                    Diagnosis  
   
                                                      
  
  
Encounter for general adult medical examination without abnormal findings  
  
documented in this encounter  
[facility]  
Work Phone: [phone]  
  
Reason for Referral  
  
  
                          Specialty    Diagnoses / Procedures Referred By Contac  
t Referred To Contact  
   
                                        Radiology             
  
  
Diagnoses  
  
  
Encounter for general adult   
medical examination without   
abnormal findings  
  
  
  
Procedures  
  
  
CT cardiac scoring wo IV   
contrast                                  
  
  
Ranney, Christopher Behr,  [address]  
  
  
[address]  
  
  
[place] [address]  
  
  
Phone: [phone]                       
  
  
  
  
  
                          Referral ID  Status       Reason       Start   
Date                                    Expiration   
Date                                    Visits   
Requested                               Visits   
Authorized  
   
                                        [id number]             Pending   
Review                                    
  
  
Perform   
Procedure                                 
3                   2024          1                   1  
  
  
  
Summary Purpose  
  
  
                                                      
  
  
  
Family History  
No Family History Records Found  
  
Advance Directives  
No Advanced Directives Records Found  
  
Additional Source Comments  
  
  
  
                                                    Reason for Visit (unrecogniz  
ed section and content)  
   
                                          
  
  
  
                          Specialty    Diagnoses / Procedures Referred By Contac  
t Referred To Contact  
   
                                        Radiology             
  
  
Diagnoses  
  
  
Encounter for general adult   
medical examination without   
abnormal findings  
  
  
  
Procedures  
  
  
CT cardiac scoring wo IV   
contrast                                  
  
  
Ranney, Christopher Behr, MD  
  
  
[address]  
  
  
[address]  
  
  
[place] [address]  
  
  
Phone: [phone]                       
  
  
  
  
  
                          Referral ID  Status       Reason       Start   
Date                                    Expiration   
Date                                    Visits   
Requested                               Visits   
Authorized  
   
                                        [id number]             Pending   
Review                                    
  
  
Perform   
Procedure                                 
3                   2024          1                   1  
  
  
  
  
  
                                                    PREGNANCY (unrecognized sect  
ion and content)  
   
                                                    No Pregnancy Status Records   
Found  
  
  
  
  
                                                    INFORMATION SOURCE (unrecogn  
ized section and content)  
   
                                          
  
  
  
                                        DATE CREATED        AUTHOR  
   
                                2024                      [facility]  
  
  
FOR RECORDS PERTAINING TO PATIENTS WHO ARE OR HAVE BEEN ENROLLED IN A CHEMICAL 
DEPENDENCY/SUBSTANCEABUSE PROGRAM, SOME INFORMATION MAY BE OMITTED. This 
clinical summary was aggregated from multiple sources. Caution should be 
exercised in using it in the provision of clinical care. This summary normalizes
information from multiple sources, and as a consequence, information in this 
document may materially change the coding, format and clinical context of 
patient data. In addition, data may be omitted in some cases. CLINICAL DECISIONS
SHOULD BE BASED ON THE PRIMARY CLINICAL RECORDS. [employer] provides 
no warranty or guarantee of the accuracy or completeness of information in this 
document.

## 2025-06-19 NOTE — RAD_ITS
REPORT-ID:[id number] 
 
EXAM:  XR CERVICAL SPINE, 4 OR 5 VIEWS 
 
CLINICAL INDICATION:  pain 
 
TECHNIQUE:  Frontal, lateral and bilateral oblique views of the cervical 
spine. 
 
COMPARISON:  11/14/2019 radiographs and 11/27/2019 MRI 
 
FINDINGS: 
 
VERTEBRAE:  Multilevel facet, uncovertebral joint, and endplate 
osteophytosis.  Multilevel endplate sclerosis.  Reversal of expected 
cervical lordosis.  Mild osseous encroachment of multiple neural foramina 
identified on oblique views particularly on the left at C6-C7.  No 
spondylolisthesis.  No fracture or traumatic subluxation. 
 
DISC SPACES:  Multilevel intervertebral disc height loss. 
 
SOFT TISSUES:  No significant abnormality.  No prevertebral soft tissue 
widening. 
 
LUNG APICES:  Clear. 
 
ORDER #: [id number] RAD/Cerv Spine 4 or 5 Views  
IMPRESSION:  
 
  
No fracture or traumatic subluxation.  Multilevel degenerative changes,  
progressed since prior examination.  
 
  
Electronically Signed:  
Kendall Morris DO  
2024/10/24 at 0:02 EDT  
Reading Location ID and State: [id number] / [place]  
Tel [phone], Service support  [phone], Fax [phone] Sent to Dr. Belle for review.